# Patient Record
Sex: MALE | Race: WHITE | NOT HISPANIC OR LATINO | Employment: FULL TIME | URBAN - NONMETROPOLITAN AREA
[De-identification: names, ages, dates, MRNs, and addresses within clinical notes are randomized per-mention and may not be internally consistent; named-entity substitution may affect disease eponyms.]

---
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
## 2020-01-28 ENCOUNTER — APPOINTMENT (OUTPATIENT)
Dept: GENERAL RADIOLOGY | Facility: CLINIC | Age: 20
End: 2020-01-28
Attending: FAMILY MEDICINE
Payer: COMMERCIAL

## 2020-01-28 ENCOUNTER — APPOINTMENT (OUTPATIENT)
Dept: GENERAL RADIOLOGY | Facility: CLINIC | Age: 20
End: 2020-01-28
Attending: NURSE PRACTITIONER
Payer: COMMERCIAL

## 2020-01-28 ENCOUNTER — HOSPITAL ENCOUNTER (EMERGENCY)
Facility: CLINIC | Age: 20
Discharge: HOME | End: 2020-01-28
Attending: NURSE PRACTITIONER | Admitting: NURSE PRACTITIONER
Payer: COMMERCIAL

## 2020-01-28 VITALS
RESPIRATION RATE: 16 BRPM | TEMPERATURE: 98.1 F | SYSTOLIC BLOOD PRESSURE: 143 MMHG | DIASTOLIC BLOOD PRESSURE: 81 MMHG | OXYGEN SATURATION: 99 %

## 2020-01-28 DIAGNOSIS — S62.326A CLOSED DISPLACED FRACTURE OF SHAFT OF FIFTH METACARPAL BONE OF RIGHT HAND, INITIAL ENCOUNTER: ICD-10-CM

## 2020-01-28 PROCEDURE — 73130 X-RAY EXAM OF HAND: CPT | Mod: TC,RT

## 2020-01-28 PROCEDURE — 26600 TREAT METACARPAL FRACTURE: CPT | Mod: RT | Performed by: NURSE PRACTITIONER

## 2020-01-28 PROCEDURE — 99284 EMERGENCY DEPT VISIT MOD MDM: CPT | Mod: 25 | Performed by: NURSE PRACTITIONER

## 2020-01-28 PROCEDURE — 99282 EMERGENCY DEPT VISIT SF MDM: CPT | Mod: 25 | Performed by: NURSE PRACTITIONER

## 2020-01-28 PROCEDURE — 26600 TREAT METACARPAL FRACTURE: CPT | Mod: 54 | Performed by: NURSE PRACTITIONER

## 2020-01-28 PROCEDURE — 40000986 XR HAND PORT RT G/E 3 VW: Mod: RT

## 2020-01-28 PROCEDURE — 25000132 ZZH RX MED GY IP 250 OP 250 PS 637: Performed by: NURSE PRACTITIONER

## 2020-01-28 RX ORDER — IBUPROFEN 600 MG/1
600 TABLET, FILM COATED ORAL ONCE
Status: COMPLETED | OUTPATIENT
Start: 2020-01-28 | End: 2020-01-28

## 2020-01-28 RX ADMIN — IBUPROFEN 600 MG: 600 TABLET ORAL at 17:06

## 2020-01-28 NOTE — LETTER
January 28, 2020      To Whom It May Concern:      Miguel Sol was seen in our Emergency Department today, 01/28/20.  He cannot use his right hand until cleared by orthopedics.    Thank you      Sincerely,        CRISTOBAL Harrington CNP

## 2020-01-28 NOTE — DISCHARGE INSTRUCTIONS
I would recommend scheduled ibuprofen, 600 mg every 6 hours for pain and swelling, this can be alternated with Tylenol 650 mg every 4 hours.  I would plan on doing this for the next 2 to 3 days at least.  Ice to right hand for 20 minutes at a time every 1-2 hours for the first 48 hours, keep elevated above the heart to help with the swelling.  Follow-up with orthopedic specialist on February 5 as scheduled.
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
## 2020-02-05 ENCOUNTER — ANCILLARY PROCEDURE (OUTPATIENT)
Dept: GENERAL RADIOLOGY | Facility: CLINIC | Age: 20
End: 2020-02-05
Attending: ORTHOPAEDIC SURGERY

## 2020-02-05 ENCOUNTER — OFFICE VISIT (OUTPATIENT)
Dept: ORTHOPEDICS | Facility: CLINIC | Age: 20
End: 2020-02-05

## 2020-02-05 VITALS — WEIGHT: 168.2 LBS | RESPIRATION RATE: 18 BRPM | BODY MASS INDEX: 23.55 KG/M2 | HEIGHT: 71 IN

## 2020-02-05 DIAGNOSIS — T14.8XXA FRACTURE: ICD-10-CM

## 2020-02-05 DIAGNOSIS — S62.326A CLOSED DISPLACED FRACTURE OF SHAFT OF FIFTH METACARPAL BONE OF RIGHT HAND, INITIAL ENCOUNTER: Primary | ICD-10-CM

## 2020-02-05 PROCEDURE — 73130 X-RAY EXAM OF HAND: CPT | Mod: TC

## 2020-02-05 PROCEDURE — 99203 OFFICE O/P NEW LOW 30 MIN: CPT | Mod: 57 | Performed by: ORTHOPAEDIC SURGERY

## 2020-02-05 PROCEDURE — 26605 TREAT METACARPAL FRACTURE: CPT | Mod: RT | Performed by: ORTHOPAEDIC SURGERY

## 2020-02-05 ASSESSMENT — MIFFLIN-ST. JEOR: SCORE: 1800.08

## 2020-02-05 ASSESSMENT — PAIN SCALES - GENERAL: PAINLEVEL: NO PAIN (1)

## 2020-02-05 NOTE — LETTER
2/5/2020         RE: Miguel Sol  [address] [facility] [address]        Dear Colleague,    Thank you for referring your patient, Miguel Sol, to the [facility]. Please see a copy of my visit note below.    Assessment / Plan     ICD-10-CM    1. Closed displaced fracture of shaft of fifth metacarpal bone of right hand, initial encounter S62.326A CAST SUPPLY S ARM SPLINT ADULT     Case Request: OPEN REDUCTION INTERNAL FIXATION, FRACTURE, HAND right 5th metacarpal shaft     Case Request: OPEN REDUCTION INTERNAL FIXATION, FRACTURE, HAND right 5th metacarpal shaft     XR Hand Right G/E 3 Views        Plan: He is significant concern over any type of surgical treatment for his hand fracture.  This is because he does not wish to have metal in his body for  service.  We will try a closed reduction as well as splinting.  We will see him back in a few days for repeat x-rays.  We will possibly do surgery if the fracture re-displaces.  We discussed risk benefits alternatives and potential complications of surgical treatment of this.  We also discussed that it would to be at least 6 months before we would remove any plate or hardware if open reduction internal fixation was necessary.  Discussed the risks and benefits as well as complications due to anesthesia in addition.  All questions were answered.    Procedure.  A gentle reduction maneuver was performed he was then splinted in intrinsic plus position.  Postreduction x-rays did show significantly proved alignment of his fracture.  Follow up      HPI / History / ROS   Miguel Sol is a 19 year old male who is being seen for   Chief Complaint   Patient presents with     Consult     ED f/u rt hand injury on 1/28/20   He was working on a door several days ago at work.  He then ended up hitting his hand had immediate pain and deformity.  Did go to the emergency room where he had x-rays taken.  It did show a displaced fracture of his fifth  metacarpal.  This was reduced slightly and then splinted.  He is here for further evaluation.    Medications  No current outpatient medications on file prior to visit.  No current facility-administered medications on file prior to visit.          Allergies     Allergies   Allergen Reactions     Wool Fiber Itching and Swelling     Penicillins Rash     other family members with allergy to same        PFSH  Past Medical History:   Diagnosis Date     ADHD (attention deficit hyperactivity disorder)      Obesity (BMI 30.0-34.9) 1/28/2015     ODD (oppositional defiant disorder)      Paranoid (H)      Phobia      Unspecified chronic suppurative otitis media      Unspecified epilepsy without mention of intractable epilepsy        Past Surgical History:   Procedure Laterality Date     HC CREATE EARDRUM OPENING,GEN ANESTH  11/2001       Family History   Problem Relation Age of Onset     Alcohol/Drug Maternal Grandfather      Heart Disease Maternal Grandfather         great     Allergies Mother         pcn     Anesthesia Reaction Mother         hard to awaken     Depression Mother      Allergies Brother         zantac,pcn     Allergies Maternal Aunt         x 2 of pcn     Cancer Maternal Grandmother         great -lung     Thyroid Disease Maternal Grandmother      Genetic Disorder Other         downs?     Genetic Disorder Paternal Aunt         downs     Gastrointestinal Disease Brother      Heart Disease Paternal Grandfather      Hypertension Paternal Grandfather      Heart Disease Father      Thyroid Disease Maternal Aunt        Social History     Tobacco Use     Smoking status: Passive Smoke Exposure - Never Smoker     Smokeless tobacco: Never Used     Tobacco comment: mother smokes, uncle   Substance Use Topics     Alcohol use: Not Currently          ROS  Review of Systems   REVIEW OF SYSTEMS  General: negative for, night sweats, dizziness, fatigue  Resp: No shortness of breath and no cough  CV: negative for chest pain,  "syncope or near-syncope  GI: negative for nausea, vomiting and diarrhea  : negative for dysuria and hematuria  Musculoskeletal: as above  Neurologic: negative for syncope   Hematologic: negative for bleeding disorder    Physical / Results   Physical Exam         Physical Exam:  Vitals: Resp 18   Ht 1.803 m (5' 11\")   Wt 76.3 kg (168 lb 3.2 oz)   BMI 23.46 kg/m     BMI= Body mass index is 23.46 kg/m .  Constitutional: healthy, alert and no acute distress   Psychiatric: mentation appears normal and affect normal/bright  NEURO: no focal deficits  RESP: Normal with easy respirations and no use of accessory muscles to breathe, no audible wheezing or retractions  CV: Pulses are palpable  SKIN: Warm with no erythema  JOINT/EXTREMITIES: Evaluation of his right hand.  He has a significant prominence in the midportion of the metacarpal region.  His skin is warm.  There does appear to be difficulty flexing his fingers.  He has tenderness along the area of the fracture.  Grossly neurologically intact.  He has good capillary refill to the fingertip.  Does have palpable pulses.      IMAGING:   X-rays show displaced fifth metacarpal midshaft fracture with significant angulation.  Slight rotational deformity is noted.  Independent visualization of the images was performed.    Corey Koo MD    Again, thank you for allowing me to participate in the care of your patient.        Sincerely,        Corey Koo MD    "

## 2020-02-06 ENCOUNTER — TELEPHONE (OUTPATIENT)
Dept: ORTHOPEDICS | Facility: CLINIC | Age: 20
End: 2020-02-06

## 2020-02-06 DIAGNOSIS — S62.326A CLOSED DISPLACED FRACTURE OF SHAFT OF FIFTH METACARPAL BONE OF RIGHT HAND, INITIAL ENCOUNTER: Primary | ICD-10-CM

## 2020-02-06 NOTE — TELEPHONE ENCOUNTER
Type of surgery: ORIF hand fracture  Location of surgery: [facility]   Date of surgery: 2/10/20  Surgeon: Dr. Koo  Pre-Op Appt Date: 2/7/20  Post-Op Appt Date: 2/25/20   Packet sent out: Surgery packet was given to patient in clinic.   Pre-cert/Authorization completed: NA  Date: 2/6/2020    Keisha Navarro  Surgery Scheduler

## 2020-02-07 ENCOUNTER — OFFICE VISIT (OUTPATIENT)
Dept: FAMILY MEDICINE | Facility: OTHER | Age: 20
End: 2020-02-07

## 2020-02-07 VITALS
SYSTOLIC BLOOD PRESSURE: 118 MMHG | TEMPERATURE: 98.3 F | BODY MASS INDEX: 23.15 KG/M2 | DIASTOLIC BLOOD PRESSURE: 82 MMHG | OXYGEN SATURATION: 99 % | RESPIRATION RATE: 16 BRPM | HEART RATE: 62 BPM | WEIGHT: 166 LBS

## 2020-02-07 DIAGNOSIS — Z01.818 PREOP GENERAL PHYSICAL EXAM: Primary | ICD-10-CM

## 2020-02-07 DIAGNOSIS — S62.326D CLOSED DISPLACED FRACTURE OF SHAFT OF FIFTH METACARPAL BONE OF RIGHT HAND WITH ROUTINE HEALING, SUBSEQUENT ENCOUNTER: ICD-10-CM

## 2020-02-07 PROCEDURE — 99214 OFFICE O/P EST MOD 30 MIN: CPT | Performed by: PHYSICIAN ASSISTANT

## 2020-02-07 ASSESSMENT — PAIN SCALES - GENERAL: PAINLEVEL: NO PAIN (0)

## 2020-02-07 NOTE — PATIENT INSTRUCTIONS
Before Your Surgery      Call your surgeon if there is any change in your health. This includes signs of a cold or flu (such as a sore throat, runny nose, cough, rash or fever).    Do not smoke, drink alcohol or take over the counter medicine (unless your surgeon or primary care doctor tells you to) for the 24 hours before and after surgery.  o No alcohol 24 hours prior to procedure  o No smoking 2 weeks before or 2 weeks afters  o NSAIDs - (ibuprofen, advil, aleve, motrin, naproxen) 5-7 days prior     If you take prescribed drugs: Follow your doctor s orders about which medicines to take and which to stop until after surgery.    Eating and drinking prior to surgery: follow the instructions from your surgeon  o No eating/drinking 8-12 hours prior     Take a shower or bath the night before surgery. Use the soap your surgeon gave you to gently clean your skin. If you do not have soap from your surgeon, use your regular soap. Do not shave or scrub the surgery site.  Wear clean pajamas and have clean sheets on your bed.

## 2020-02-07 NOTE — PROGRESS NOTES
Assessment / Plan     ICD-10-CM    1. Closed displaced fracture of shaft of fifth metacarpal bone of right hand, initial encounter S62.326A CAST SUPPLY S ARM SPLINT ADULT     Case Request: OPEN REDUCTION INTERNAL FIXATION, FRACTURE, HAND right 5th metacarpal shaft     Case Request: OPEN REDUCTION INTERNAL FIXATION, FRACTURE, HAND right 5th metacarpal shaft     XR Hand Right G/E 3 Views        Plan: He is significant concern over any type of surgical treatment for his hand fracture.  This is because he does not wish to have metal in his body for  service.  We will try a closed reduction as well as splinting.  We will see him back in a few days for repeat x-rays.  We will possibly do surgery if the fracture re-displaces.  We discussed risk benefits alternatives and potential complications of surgical treatment of this.  We also discussed that it would to be at least 6 months before we would remove any plate or hardware if open reduction internal fixation was necessary.  Discussed the risks and benefits as well as complications due to anesthesia in addition.  All questions were answered.    Procedure.  A gentle reduction maneuver was performed he was then splinted in intrinsic plus position.  Postreduction x-rays did show significantly proved alignment of his fracture.  Follow up      HPI / History / ILIANA   Miguel Sol is a 19 year old male who is being seen for   Chief Complaint   Patient presents with     Consult     ED f/u rt hand injury on 1/28/20   He was working on a door several days ago at work.  He then ended up hitting his hand had immediate pain and deformity.  Did go to the emergency room where he had x-rays taken.  It did show a displaced fracture of his fifth metacarpal.  This was reduced slightly and then splinted.  He is here for further evaluation.    Medications  No current outpatient medications on file prior to visit.  No current facility-administered medications on file prior to visit.           Allergies     Allergies   Allergen Reactions     Wool Fiber Itching and Swelling     Penicillins Rash     other family members with allergy to same        PFSH  Past Medical History:   Diagnosis Date     ADHD (attention deficit hyperactivity disorder)      Obesity (BMI 30.0-34.9) 1/28/2015     ODD (oppositional defiant disorder)      Paranoid (H)      Phobia      Unspecified chronic suppurative otitis media      Unspecified epilepsy without mention of intractable epilepsy        Past Surgical History:   Procedure Laterality Date     HC CREATE EARDRUM OPENING,GEN ANESTH  11/2001       Family History   Problem Relation Age of Onset     Alcohol/Drug Maternal Grandfather      Heart Disease Maternal Grandfather         great     Allergies Mother         pcn     Anesthesia Reaction Mother         hard to awaken     Depression Mother      Allergies Brother         zantac,pcn     Allergies Maternal Aunt         x 2 of pcn     Cancer Maternal Grandmother         great -lung     Thyroid Disease Maternal Grandmother      Genetic Disorder Other         downs?     Genetic Disorder Paternal Aunt         downs     Gastrointestinal Disease Brother      Heart Disease Paternal Grandfather      Hypertension Paternal Grandfather      Heart Disease Father      Thyroid Disease Maternal Aunt        Social History     Tobacco Use     Smoking status: Passive Smoke Exposure - Never Smoker     Smokeless tobacco: Never Used     Tobacco comment: mother smokes, uncle   Substance Use Topics     Alcohol use: Not Currently          ROS  Review of Systems   REVIEW OF SYSTEMS  General: negative for, night sweats, dizziness, fatigue  Resp: No shortness of breath and no cough  CV: negative for chest pain, syncope or near-syncope  GI: negative for nausea, vomiting and diarrhea  : negative for dysuria and hematuria  Musculoskeletal: as above  Neurologic: negative for syncope   Hematologic: negative for bleeding disorder    Physical / Results  "  Physical Exam         Physical Exam:  Vitals: Resp 18   Ht 1.803 m (5' 11\")   Wt 76.3 kg (168 lb 3.2 oz)   BMI 23.46 kg/m    BMI= Body mass index is 23.46 kg/m .  Constitutional: healthy, alert and no acute distress   Psychiatric: mentation appears normal and affect normal/bright  NEURO: no focal deficits  RESP: Normal with easy respirations and no use of accessory muscles to breathe, no audible wheezing or retractions  CV: Pulses are palpable  SKIN: Warm with no erythema  JOINT/EXTREMITIES: Evaluation of his right hand.  He has a significant prominence in the midportion of the metacarpal region.  His skin is warm.  There does appear to be difficulty flexing his fingers.  He has tenderness along the area of the fracture.  Grossly neurologically intact.  He has good capillary refill to the fingertip.  Does have palpable pulses.      IMAGING:   X-rays show displaced fifth metacarpal midshaft fracture with significant angulation.  Slight rotational deformity is noted.  Independent visualization of the images was performed.    Corey Koo MD  "

## 2020-02-07 NOTE — NURSING NOTE
Health Maintenance Due   Topic Date Due     PREVENTIVE CARE VISIT  08/04/2007     HPV IMMUNIZATION (1 - Male 2-dose series) 07/26/2011     HIV SCREENING  07/26/2015     INFLUENZA VACCINE (1) 09/01/2019       Health Maintenance reviewed at today's visit patient asked to schedule/complete:   Routine Health Visit:  Patient declined    Anali Lee MA 2/7/2020  2:55 PM
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
## 2020-02-09 ENCOUNTER — APPOINTMENT (OUTPATIENT)
Dept: GENERAL RADIOLOGY | Facility: CLINIC | Age: 20
End: 2020-02-09
Attending: FAMILY MEDICINE
Payer: COMMERCIAL

## 2020-02-09 ENCOUNTER — HOSPITAL ENCOUNTER (EMERGENCY)
Facility: CLINIC | Age: 20
Discharge: HOME | End: 2020-02-09
Attending: FAMILY MEDICINE | Admitting: FAMILY MEDICINE
Payer: COMMERCIAL

## 2020-02-09 VITALS
TEMPERATURE: 97.5 F | WEIGHT: 165 LBS | OXYGEN SATURATION: 100 % | DIASTOLIC BLOOD PRESSURE: 73 MMHG | HEART RATE: 92 BPM | RESPIRATION RATE: 16 BRPM | BODY MASS INDEX: 23.01 KG/M2 | SYSTOLIC BLOOD PRESSURE: 127 MMHG

## 2020-02-09 DIAGNOSIS — M79.641 PAIN OF RIGHT HAND: ICD-10-CM

## 2020-02-09 PROCEDURE — 99283 EMERGENCY DEPT VISIT LOW MDM: CPT | Mod: Z6 | Performed by: FAMILY MEDICINE

## 2020-02-09 PROCEDURE — 99283 EMERGENCY DEPT VISIT LOW MDM: CPT | Performed by: FAMILY MEDICINE

## 2020-02-09 PROCEDURE — 73130 X-RAY EXAM OF HAND: CPT | Mod: TC,RT

## 2020-02-09 ASSESSMENT — ENCOUNTER SYMPTOMS: NUMBNESS: 1
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
## 2020-02-09 NOTE — ED AVS SNAPSHOT
[facility] Emergency Department  [address]   [place] [address]  Phone:  [phone]  Fax:  [phone]                                    Miguel Sol   MRN: [id number]    Department:  [facility] Emergency Department   Date of Visit:  2/9/2020           After Visit Summary Signature Page    I have received my discharge instructions, and my questions have been answered. I have discussed any challenges I see with this plan with the nurse or doctor.    ..........................................................................................................................................  Patient/Patient Representative Signature      ..........................................................................................................................................  Patient Representative Print Name and Relationship to Patient    ..................................................               ................................................  Date                                   Time    ..........................................................................................................................................  Reviewed by Signature/Title    ...................................................              ..............................................  Date                                               Time          22EPIC Rev 08/18

## 2020-02-09 NOTE — DISCHARGE INSTRUCTIONS
Keep the hand elevated above the level of your heart - use the sling to prevent swelling, pain and numbness.

## 2020-02-09 NOTE — ED TRIAGE NOTES
Patient with numbness to R hand, had a cast placed on Tuesday of this week. States hand has felt numb since yesterday.

## 2020-02-10 ENCOUNTER — HOSPITAL ENCOUNTER (OUTPATIENT)
Facility: CLINIC | Age: 20
Discharge: HOME | End: 2020-02-10
Attending: ORTHOPAEDIC SURGERY | Admitting: ORTHOPAEDIC SURGERY
Payer: COMMERCIAL

## 2020-02-10 ENCOUNTER — DOCUMENTATION ONLY (OUTPATIENT)
Dept: OTHER | Facility: CLINIC | Age: 20
End: 2020-02-10

## 2020-02-10 ENCOUNTER — HOSPITAL ENCOUNTER (OUTPATIENT)
Dept: GENERAL RADIOLOGY | Facility: CLINIC | Age: 20
End: 2020-02-10
Attending: PHYSICIAN ASSISTANT
Payer: COMMERCIAL

## 2020-02-10 ENCOUNTER — ANESTHESIA (OUTPATIENT)
Dept: SURGERY | Facility: CLINIC | Age: 20
End: 2020-02-10

## 2020-02-10 ENCOUNTER — ANESTHESIA EVENT (OUTPATIENT)
Dept: SURGERY | Facility: CLINIC | Age: 20
End: 2020-02-10

## 2020-02-10 DIAGNOSIS — S62.326A CLOSED DISPLACED FRACTURE OF SHAFT OF FIFTH METACARPAL BONE OF RIGHT HAND, INITIAL ENCOUNTER: ICD-10-CM

## 2020-02-10 PROCEDURE — 73130 X-RAY EXAM OF HAND: CPT | Mod: TC

## 2020-02-10 NOTE — ANESTHESIA PREPROCEDURE EVALUATION
Anesthesia Pre-Procedure Evaluation    Patient: Miguel Sol   MRN: [id number] : 2000          Preoperative Diagnosis: Closed displaced fracture of shaft of fifth metacarpal bone of right hand, initial encounter [S62.326A]    Procedure(s):  OPEN REDUCTION INTERNAL FIXATION, FRACTURE, HAND right 5th metacarpal shaft    Past Medical History:   Diagnosis Date     ADHD (attention deficit hyperactivity disorder)      Obesity (BMI 30.0-34.9) 2015     ODD (oppositional defiant disorder)      Paranoid (H)      Phobia      Unspecified chronic suppurative otitis media      Unspecified epilepsy without mention of intractable epilepsy      Past Surgical History:   Procedure Laterality Date     HC CREATE EARDRUM OPENING,GEN ANESTH  2001       Anesthesia Evaluation     . Pt has had prior anesthetic. Type: General and MAC    No history of anesthetic complications          ROS/MED HX    ENT/Pulmonary:     (+)other ENT- perforated eardrum, chronic otitis media, , . .    Neurologic:  - neg neurologic ROS     Cardiovascular:  - neg cardiovascular ROS   (+) ----. : . . . :. . Previous cardiac testing date:results:date: results:ECG reviewed date:19 results:Sinus Rhythm   WITHIN NORMAL LIMITS   date: results:          METS/Exercise Tolerance:  >4 METS   Hematologic:  - neg hematologic  ROS       Musculoskeletal:  - neg musculoskeletal ROS       GI/Hepatic:  - neg GI/hepatic ROS       Renal/Genitourinary:  - ROS Renal section negative       Endo:  - neg endo ROS       Psychiatric: Comment: ADHD, phobia, paranoid, oppositional defiant disorder    (+) psychiatric history other (comment)      Infectious Disease:  - neg infectious disease ROS       Malignancy:      - no malignancy   Other:    - neg other ROS                      Physical Exam  Normal systems: cardiovascular, pulmonary and dental    Airway   Mallampati: II  TM distance: >3 FB  Neck ROM: full    Dental     Cardiovascular   Rhythm and rate: regular and  "normal      Pulmonary    breath sounds clear to auscultation            Lab Results   Component Value Date    WBC 8.9 07/20/2019    HGB 14.5 07/20/2019    HCT 42.4 07/20/2019     07/20/2019    SED 10 06/10/2004     07/20/2019    POTASSIUM 3.8 07/20/2019    CHLORIDE 106 07/20/2019    CO2 27 07/20/2019    BUN 14 07/20/2019    CR 0.78 07/20/2019    GLC 90 07/20/2019    KUNAL 9.1 07/20/2019       Preop Vitals  BP Readings from Last 3 Encounters:   02/09/20 127/73   02/07/20 118/82   01/28/20 (!) 143/81    Pulse Readings from Last 3 Encounters:   02/09/20 92   02/07/20 62   11/07/19 74      Resp Readings from Last 3 Encounters:   02/09/20 16   02/07/20 16   02/05/20 18    SpO2 Readings from Last 3 Encounters:   02/09/20 100%   02/07/20 99%   01/28/20 99%      Temp Readings from Last 1 Encounters:   02/09/20 97.5  F (36.4  C) (Oral)    Ht Readings from Last 1 Encounters:   02/05/20 1.803 m (5' 11\") (69 %)*     * Growth percentiles are based on CDC (Boys, 2-20 Years) data.      Wt Readings from Last 1 Encounters:   02/09/20 74.8 kg (165 lb) (66 %)*     * Growth percentiles are based on CDC (Boys, 2-20 Years) data.    Estimated body mass index is 23.01 kg/m  as calculated from the following:    Height as of 2/5/20: 1.803 m (5' 11\").    Weight as of 2/9/20: 74.8 kg (165 lb).       Anesthesia Plan      History & Physical Review  History and physical reviewed and following examination; no interval change.    ASA Status:  1 .    NPO Status:  > 8 hours    Plan for ETT and General with Propofol and Intravenous induction. Maintenance will be Inhalation and Balanced.    PONV prophylaxis:  Ondansetron (or other 5HT-3) and Dexamethasone or Solumedrol       Postoperative Care  Postoperative pain management:  IV analgesics, Multi-modal analgesia and Oral pain medications.      Consents  Anesthetic plan, risks, benefits and alternatives discussed with:  Patient.  Use of blood products discussed: No .   .           "       Carmen Delgado, APRN CRNA

## 2020-02-12 NOTE — PROGRESS NOTES
"Subjective     Miguel Sol is a 19 year old male who presents to clinic today for the following health issues:    Patient is here to get tested for STD.     Just found out that his last girlfriend had an STD, but she wouldn't tell him what one she had.      Denies pain or discharge, other c/o.             Review of Systems   ROS COMP: Constitutional, HEENT, cardiovascular, pulmonary, gi and gu systems are negative, except as otherwise noted.       Objective    /70   Pulse 89   Temp 97  F (36.1  C) (Temporal)   Resp 18   Ht 1.803 m (5' 11\")   Wt 74.5 kg (164 lb 3.2 oz)   SpO2 100%   BMI 22.90 kg/m    Body mass index is 22.9 kg/m .  Physical Exam   GENERAL: healthy, alert and no distress  NECK: no adenopathy, no asymmetry, masses, or scars and thyroid normal to palpation  RESP: lungs clear to auscultation - no rales, rhonchi or wheezes  CV: regular rate and rhythm, normal S1 S2, no S3 or S4, no murmur, click or rub, no peripheral edema and peripheral pulses strong  ABDOMEN: soft, nontender, no hepatosplenomegaly, no masses and bowel sounds normal  MS: no gross musculoskeletal defects noted, no edema.  Right hand/wrist in a cast.      Diagnostic Test Results:  Labs reviewed in Epic  Results for orders placed or performed in visit on 02/13/20   Neisseria gonorrhoeae PCR     Status: None   Result Value Ref Range    Specimen Descrip Urine     N Gonorrhea PCR Negative NEG^Negative   Chlamydia trachomatis PCR     Status: None   Result Value Ref Range    Specimen Description Urine     Chlamydia Trachomatis PCR Negative NEG^Negative   Results for orders placed or performed in visit on 02/13/20   HIV Screening     Status: None   Result Value Ref Range    HIV Antigen Antibody Combo Nonreactive NR^Nonreactive       Treponema Abs w Reflex to RPR and Titer     Status: None   Result Value Ref Range    Treponema Antibodies Nonreactive NR^Nonreactive   Hepatitis B surface antigen     Status: None   Result Value Ref " "Range    Hep B Surface Agn Nonreactive NR^Nonreactive   Hepatitis C antibody     Status: None   Result Value Ref Range    Hepatitis C Antibody Nonreactive NR^Nonreactive           Assessment & Plan       ICD-10-CM    1. Screen for STD (sexually transmitted disease) Z11.3 HIV Screening     Treponema Abs w Reflex to RPR and Titer     Hepatitis B surface antigen     Hepatitis C antibody     Chlamydia trachomatis PCR     Neisseria gonorrhoeae PCR     CANCELED: NEISSERIA GONORRHOEA PCR     CANCELED: CHLAMYDIA TRACHOMATIS PCR   2. Influenza vaccine refused Z28.21      1.  Discussed briefly STDs that we can screen for and which ones he wished to pursue testing for.  At this time, he does not have any symptoms characteristic of any particular STD.  Appropriate testing was ordered.  2.  Patient declined immunization.  Discussed risks of not immunizing.    Portions of this note were completed using Dragon dictation software.  Although reviewed, there may be typographical and other inadvertent errors that remain.                Patient Instructions   Thank you for visiting [facility]    We'll let you know your lab results as soon as we can.     I would recommend you consider getting vaccinated.    Contact us or return if questions or concerns.     Have a nice day!    Dr. Gray     Return in about 1 year (around 2/13/2021) for Physical Exam.      If you had imaging scheduled please refer to your radiology prep sheet.      If you need medication refills, please contact your pharmacy 3 days before your prescriptions runs out or download the [facility] mirna for your smart phone.   If you are out of refills, your pharmacy will contact contact the clinic.    Contact us or return if questions or concerns.     -Your [facility] Care Team:    MD Caroline Webster PA-C Joel De Haan, PA-C Anna Niesen, PA-C Elizabeth \"\" CRISTOBAL Tyler CNP, APRN, NIC Valdes" CRISTOBAL Su, CNP  Almas Taylor, RN, BSN       General information about your   [facility]      Clinic hours:     Lab hours:  Phone [phone]  Monday 7:30 am-7 pm    Monday 8:30 am-6:30 pm  Tuesday-Friday 7:30 am-5 pm   Tuesday-Friday 8:30 am-4:30 pm    Pharmacy hours:  Phone [phone]  Monday 8:30 am-7pm  Tuesday-Friday 8:30am-6 pm                                     Mychart assistance [phone]    We would like to hear from you, how was your visit today?    Meseret France  Patient Information Supervisor   Patient Care Supervisor  [facility], and \[facility] [place] [facility]\"", and [facility]                Return in about 1 year (around 2/13/2021) for Physical Exam.    Pastor Gray MD, MD  [facility]

## 2020-02-13 ENCOUNTER — OFFICE VISIT (OUTPATIENT)
Dept: FAMILY MEDICINE | Facility: OTHER | Age: 20
End: 2020-02-13
Payer: COMMERCIAL

## 2020-02-13 VITALS
BODY MASS INDEX: 22.99 KG/M2 | TEMPERATURE: 97 F | DIASTOLIC BLOOD PRESSURE: 70 MMHG | RESPIRATION RATE: 18 BRPM | HEART RATE: 89 BPM | SYSTOLIC BLOOD PRESSURE: 112 MMHG | WEIGHT: 164.2 LBS | HEIGHT: 71 IN | OXYGEN SATURATION: 100 %

## 2020-02-13 DIAGNOSIS — Z28.21 INFLUENZA VACCINE REFUSED: ICD-10-CM

## 2020-02-13 DIAGNOSIS — Z11.3 SCREEN FOR STD (SEXUALLY TRANSMITTED DISEASE): Primary | ICD-10-CM

## 2020-02-13 DIAGNOSIS — Z11.3 SCREEN FOR STD (SEXUALLY TRANSMITTED DISEASE): ICD-10-CM

## 2020-02-13 PROCEDURE — 86803 HEPATITIS C AB TEST: CPT | Performed by: FAMILY MEDICINE

## 2020-02-13 PROCEDURE — G0499 HEPB SCREEN HIGH RISK INDIV: HCPCS | Performed by: FAMILY MEDICINE

## 2020-02-13 PROCEDURE — 86780 TREPONEMA PALLIDUM: CPT | Performed by: FAMILY MEDICINE

## 2020-02-13 PROCEDURE — 99213 OFFICE O/P EST LOW 20 MIN: CPT | Performed by: FAMILY MEDICINE

## 2020-02-13 PROCEDURE — 87491 CHLMYD TRACH DNA AMP PROBE: CPT | Performed by: FAMILY MEDICINE

## 2020-02-13 PROCEDURE — 36415 COLL VENOUS BLD VENIPUNCTURE: CPT | Performed by: FAMILY MEDICINE

## 2020-02-13 PROCEDURE — 87591 N.GONORRHOEAE DNA AMP PROB: CPT | Performed by: FAMILY MEDICINE

## 2020-02-13 PROCEDURE — 87389 HIV-1 AG W/HIV-1&-2 AB AG IA: CPT | Performed by: FAMILY MEDICINE

## 2020-02-13 ASSESSMENT — PAIN SCALES - GENERAL: PAINLEVEL: NO PAIN (0)

## 2020-02-13 ASSESSMENT — MIFFLIN-ST. JEOR: SCORE: 1781.94

## 2020-02-13 NOTE — PATIENT INSTRUCTIONS
"Thank you for visiting [facility]    We'll let you know your lab results as soon as we can.     I would recommend you consider getting vaccinated.    Contact us or return if questions or concerns.     Have a nice day!    Dr. Gray     Return in about 1 year (around 2/13/2021) for Physical Exam.      If you had imaging scheduled please refer to your radiology prep sheet.      If you need medication refills, please contact your pharmacy 3 days before your prescriptions runs out or download the [facility] mirna for your smart phone.   If you are out of refills, your pharmacy will contact contact the clinic.    Contact us or return if questions or concerns.     -Your [facility] Care Team:    MD Caroline Webster, ZENAIDA De Los Santos PA-C Elizabeth \"\" CRISTOBAL Tyler CNP, APRN, NIC Su APRN, CNP  Almas Taylor, RN, BSN       General information about your   [facility]      Clinic hours:     Lab hours:  Phone [phone]  Monday 7:30 am-7 pm    Monday 8:30 am-6:30 pm  Tuesday-Friday 7:30 am-5 pm   Tuesday-Friday 8:30 am-4:30 pm    Pharmacy hours:  Phone [phone]  Monday 8:30 am-7pm  Tuesday-Friday 8:30am-6 pm                                     Mychart assistance [phone]    We would like to hear from you, how was your visit today?    Meseret France  Patient Information Supervisor   Patient Care Supervisor  [facility], and [facility] [place], and [facility]            "

## 2020-02-14 LAB
C TRACH DNA SPEC QL NAA+PROBE: NEGATIVE
HBV SURFACE AG SERPL QL IA: NONREACTIVE
HCV AB SERPL QL IA: NONREACTIVE
HIV 1+2 AB+HIV1 P24 AG SERPL QL IA: NONREACTIVE
N GONORRHOEA DNA SPEC QL NAA+PROBE: NEGATIVE
SPECIMEN SOURCE: NORMAL
SPECIMEN SOURCE: NORMAL
T PALLIDUM AB SER QL: NONREACTIVE

## 2020-02-25 ENCOUNTER — OFFICE VISIT (OUTPATIENT)
Dept: ORTHOPEDICS | Facility: CLINIC | Age: 20
End: 2020-02-25
Payer: COMMERCIAL

## 2020-02-25 ENCOUNTER — HOSPITAL ENCOUNTER (EMERGENCY)
Facility: CLINIC | Age: 20
Discharge: HOME | End: 2020-02-25
Attending: FAMILY MEDICINE | Admitting: FAMILY MEDICINE
Payer: COMMERCIAL

## 2020-02-25 ENCOUNTER — ANCILLARY PROCEDURE (OUTPATIENT)
Dept: GENERAL RADIOLOGY | Facility: CLINIC | Age: 20
End: 2020-02-25
Attending: ORTHOPAEDIC SURGERY
Payer: COMMERCIAL

## 2020-02-25 VITALS
DIASTOLIC BLOOD PRESSURE: 63 MMHG | RESPIRATION RATE: 16 BRPM | OXYGEN SATURATION: 97 % | BODY MASS INDEX: 22.59 KG/M2 | SYSTOLIC BLOOD PRESSURE: 105 MMHG | HEART RATE: 107 BPM | TEMPERATURE: 98.3 F | WEIGHT: 162 LBS

## 2020-02-25 VITALS — BODY MASS INDEX: 22.96 KG/M2 | WEIGHT: 164 LBS | HEIGHT: 71 IN

## 2020-02-25 DIAGNOSIS — R11.2 NON-INTRACTABLE VOMITING WITH NAUSEA, UNSPECIFIED VOMITING TYPE: ICD-10-CM

## 2020-02-25 DIAGNOSIS — S62.326A CLOSED DISPLACED FRACTURE OF SHAFT OF FIFTH METACARPAL BONE OF RIGHT HAND, INITIAL ENCOUNTER: ICD-10-CM

## 2020-02-25 DIAGNOSIS — S62.326A CLOSED DISPLACED FRACTURE OF SHAFT OF FIFTH METACARPAL BONE OF RIGHT HAND, INITIAL ENCOUNTER: Primary | ICD-10-CM

## 2020-02-25 DIAGNOSIS — K92.0 HEMATEMESIS, PRESENCE OF NAUSEA NOT SPECIFIED: ICD-10-CM

## 2020-02-25 LAB
ALBUMIN SERPL-MCNC: 4.5 G/DL (ref 3.4–5)
ALP SERPL-CCNC: 80 U/L (ref 65–260)
ALT SERPL W P-5'-P-CCNC: 16 U/L (ref 0–50)
ANION GAP SERPL CALCULATED.3IONS-SCNC: 7 MMOL/L (ref 3–14)
APTT PPP: 30 SEC (ref 22–37)
AST SERPL W P-5'-P-CCNC: 8 U/L (ref 0–35)
BASOPHILS # BLD AUTO: 0.1 10E9/L (ref 0–0.2)
BASOPHILS NFR BLD AUTO: 0.7 %
BILIRUB SERPL-MCNC: 0.7 MG/DL (ref 0.2–1.3)
BUN SERPL-MCNC: 10 MG/DL (ref 7–30)
CALCIUM SERPL-MCNC: 9.4 MG/DL (ref 8.5–10.1)
CHLORIDE SERPL-SCNC: 107 MMOL/L (ref 98–110)
CO2 SERPL-SCNC: 27 MMOL/L (ref 20–32)
CREAT SERPL-MCNC: 0.8 MG/DL (ref 0.5–1)
DIFFERENTIAL METHOD BLD: NORMAL
EOSINOPHIL NFR BLD AUTO: 1.1 %
ERYTHROCYTE [DISTWIDTH] IN BLOOD BY AUTOMATED COUNT: 12.7 % (ref 10–15)
GFR SERPL CREATININE-BSD FRML MDRD: >90 ML/MIN/{1.73_M2}
GLUCOSE SERPL-MCNC: 77 MG/DL (ref 70–99)
HCT VFR BLD AUTO: 48.3 % (ref 40–53)
HGB BLD-MCNC: 16.4 G/DL (ref 13.3–17.7)
IMM GRANULOCYTES # BLD: 0 10E9/L (ref 0–0.4)
IMM GRANULOCYTES NFR BLD: 0.4 %
INR PPP: 1.13 (ref 0.86–1.14)
LIPASE SERPL-CCNC: 55 U/L (ref 73–393)
LYMPHOCYTES # BLD AUTO: 1.5 10E9/L (ref 0.8–5.3)
LYMPHOCYTES NFR BLD AUTO: 17.5 %
MCH RBC QN AUTO: 29.3 PG (ref 26.5–33)
MCHC RBC AUTO-ENTMCNC: 34 G/DL (ref 31.5–36.5)
MCV RBC AUTO: 86 FL (ref 78–100)
MONOCYTES # BLD AUTO: 0.7 10E9/L (ref 0–1.3)
MONOCYTES NFR BLD AUTO: 8.9 %
NEUTROPHILS # BLD AUTO: 5.9 10E9/L (ref 1.6–8.3)
NEUTROPHILS NFR BLD AUTO: 71.4 %
NRBC # BLD AUTO: 0 10*3/UL
NRBC BLD AUTO-RTO: 0 /100
PLATELET # BLD AUTO: 261 10E9/L (ref 150–450)
POTASSIUM SERPL-SCNC: 4.2 MMOL/L (ref 3.4–5.3)
PROT SERPL-MCNC: 8.5 G/DL (ref 6.8–8.8)
RBC # BLD AUTO: 5.6 10E12/L (ref 4.4–5.9)
SODIUM SERPL-SCNC: 141 MMOL/L (ref 133–144)
WBC # BLD AUTO: 8.3 10E9/L (ref 4–11)

## 2020-02-25 PROCEDURE — 80053 COMPREHEN METABOLIC PANEL: CPT | Performed by: FAMILY MEDICINE

## 2020-02-25 PROCEDURE — 99207 ZZC FRACTURE CARE IN GLOBAL PERIOD: CPT | Performed by: ORTHOPAEDIC SURGERY

## 2020-02-25 PROCEDURE — 85730 THROMBOPLASTIN TIME PARTIAL: CPT | Performed by: FAMILY MEDICINE

## 2020-02-25 PROCEDURE — 99284 EMERGENCY DEPT VISIT MOD MDM: CPT | Mod: 25 | Performed by: FAMILY MEDICINE

## 2020-02-25 PROCEDURE — 73130 X-RAY EXAM OF HAND: CPT | Mod: TC

## 2020-02-25 PROCEDURE — 96361 HYDRATE IV INFUSION ADD-ON: CPT | Performed by: FAMILY MEDICINE

## 2020-02-25 PROCEDURE — 85610 PROTHROMBIN TIME: CPT | Performed by: FAMILY MEDICINE

## 2020-02-25 PROCEDURE — 83690 ASSAY OF LIPASE: CPT | Performed by: FAMILY MEDICINE

## 2020-02-25 PROCEDURE — 85025 COMPLETE CBC W/AUTO DIFF WBC: CPT | Performed by: FAMILY MEDICINE

## 2020-02-25 PROCEDURE — 25000128 H RX IP 250 OP 636: Performed by: FAMILY MEDICINE

## 2020-02-25 PROCEDURE — 96374 THER/PROPH/DIAG INJ IV PUSH: CPT | Performed by: FAMILY MEDICINE

## 2020-02-25 PROCEDURE — C9113 INJ PANTOPRAZOLE SODIUM, VIA: HCPCS | Performed by: FAMILY MEDICINE

## 2020-02-25 PROCEDURE — 99284 EMERGENCY DEPT VISIT MOD MDM: CPT | Mod: Z6 | Performed by: FAMILY MEDICINE

## 2020-02-25 PROCEDURE — 25800030 ZZH RX IP 258 OP 636: Performed by: FAMILY MEDICINE

## 2020-02-25 RX ORDER — OMEPRAZOLE 40 MG/1
40 CAPSULE, DELAYED RELEASE ORAL DAILY
Qty: 15 CAPSULE | Refills: 0 | Status: SHIPPED | OUTPATIENT
Start: 2020-02-25 | End: 2020-03-11

## 2020-02-25 RX ORDER — ONDANSETRON HYDROCHLORIDE 4 MG/5ML
0.1 SOLUTION ORAL ONCE
Status: COMPLETED | OUTPATIENT
Start: 2020-02-25 | End: 2020-02-25

## 2020-02-25 RX ORDER — ONDANSETRON 4 MG/1
4 TABLET, ORALLY DISINTEGRATING ORAL EVERY 6 HOURS PRN
Qty: 10 TABLET | Refills: 0 | Status: SHIPPED | OUTPATIENT
Start: 2020-02-25 | End: 2021-06-30

## 2020-02-25 RX ADMIN — SODIUM CHLORIDE 80 MG: 9 INJECTION, SOLUTION INTRAVENOUS at 20:40

## 2020-02-25 RX ADMIN — SODIUM CHLORIDE 1000 ML: 9 INJECTION, SOLUTION INTRAVENOUS at 20:40

## 2020-02-25 RX ADMIN — ONDANSETRON HYDROCHLORIDE 8 MG: 4 SOLUTION ORAL at 21:04

## 2020-02-25 ASSESSMENT — MIFFLIN-ST. JEOR: SCORE: 1781.03

## 2020-02-25 NOTE — LETTER
2/25/2020         RE: Miguel Sol  [address] [facility] [address]        Dear Colleague,    Thank you for referring your patient, Miguel Sol, to the [facility]. Please see a copy of my visit note below.    Assessment / Plan     ICD-10-CM    1. Closed displaced fracture of shaft of fifth metacarpal bone of right hand, initial encounter S62.326A XR Hand Right G/E 3 Views        Plan: We are going to keep in the cast for 1 more week.  At that time we will take him out and put him into a brace for protection if it is not completely healed.  We will see him back in 1 week.  Follow up      HPI / History / ROS   Miguel Sol is a 19 year old male who is being seen for   Chief Complaint   Patient presents with     Fracture Followup     rt hand doi 1/28/20    East 2 and half weeks out from his fifth metacarpal shaft fracture.  Overall he is been reduced and placed into a cast which has held his position quite well.  He is not having any significant problems except the cast is somewhat worn.  Pain is been well controlled.    Medications  No current outpatient medications on file prior to visit.  No current facility-administered medications on file prior to visit.          Allergies     Allergies   Allergen Reactions     Wool Fiber Itching and Swelling     Penicillins Rash     other family members with allergy to same        PFSH  Past Medical History:   Diagnosis Date     ADHD (attention deficit hyperactivity disorder)      Obesity (BMI 30.0-34.9) 1/28/2015     ODD (oppositional defiant disorder)      Paranoid (H)      Phobia      Unspecified chronic suppurative otitis media      Unspecified epilepsy without mention of intractable epilepsy        Past Surgical History:   Procedure Laterality Date     HC CREATE EARDRUM OPENING,GEN ANESTH  11/2001       Family History   Problem Relation Age of Onset     Alcohol/Drug Maternal Grandfather      Heart Disease Maternal Grandfather         great      "Allergies Mother         pcn     Anesthesia Reaction Mother         hard to awaken     Depression Mother      Allergies Brother         zantac,pcn     Allergies Maternal Aunt         x 2 of pcn     Cancer Maternal Grandmother         great -lung     Thyroid Disease Maternal Grandmother      Genetic Disorder Other         downs?     Genetic Disorder Paternal Aunt         downs     Gastrointestinal Disease Brother      Heart Disease Paternal Grandfather      Hypertension Paternal Grandfather      Heart Disease Father      Thyroid Disease Maternal Aunt        Social History     Tobacco Use     Smoking status: Passive Smoke Exposure - Never Smoker     Smokeless tobacco: Never Used     Tobacco comment: mother smokes, uncle   Substance Use Topics     Alcohol use: Not Currently          ROS  Review of Systems   REVIEW OF SYSTEMS  General: negative for, night sweats, dizziness, fatigue  Resp: No shortness of breath and no cough  CV: negative for chest pain, syncope or near-syncope  GI: negative for nausea, vomiting and diarrhea  : negative for dysuria and hematuria  Musculoskeletal: as above  Neurologic: negative for syncope   Hematologic: negative for bleeding disorder    Physical / Results   Physical Exam         Physical Exam:  Vitals: Ht 1.803 m (5' 11\")   Wt 74.4 kg (164 lb)   BMI 22.87 kg/m     BMI= Body mass index is 22.87 kg/m .  Constitutional: healthy, alert and no acute distress   Psychiatric: mentation appears normal and affect normal/bright  NEURO: no focal deficits  RESP: Normal with easy respirations and no use of accessory muscles to breathe, no audible wheezing or retractions  CV: Pulses are palpable  SKIN: Warm with no erythema  JOINT/EXTREMITIES: He is neurovascular intact.  There is no changes to his fingertips.  His brisk capillary refill.      IMAGING:   Evaluation of his right hand x-rays which show fifth metacarpal fracture in good position.  Has had very little change since the last x-rays were " taken.  There is callus formation.  Independent visualization of the images was performed.    Corey Koo MD    Again, thank you for allowing me to participate in the care of your patient.        Sincerely,        Corey Koo MD

## 2020-02-25 NOTE — ED AVS SNAPSHOT
[facility] Emergency Department  [address]   [place] [address]  Phone:  [phone]  Fax:  [phone]                                    Miguel Sol   MRN: [id number]    Department:  [facility] Emergency Department   Date of Visit:  2/25/2020           After Visit Summary Signature Page    I have received my discharge instructions, and my questions have been answered. I have discussed any challenges I see with this plan with the nurse or doctor.    ..........................................................................................................................................  Patient/Patient Representative Signature      ..........................................................................................................................................  Patient Representative Print Name and Relationship to Patient    ..................................................               ................................................  Date                                   Time    ..........................................................................................................................................  Reviewed by Signature/Title    ...................................................              ..............................................  Date                                               Time          22EPIC Rev 08/18
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
## 2020-02-26 NOTE — ED NOTES
Pt kept asking about IV removal As I was going over discontinue info. Pt was uninterested as I explained things to him. Texting and talking on the phone.

## 2020-02-26 NOTE — DISCHARGE INSTRUCTIONS
At work were reassuring.  There is no signs of coagulopathy or bleeding problems.  You received Protonix in the emergency department.  Continue Zofran 6 hours as needed for nausea next 2-3 days and omeprazole 40 mg daily 2 weeks.   All up with your primary care provider in 2-3 days if you are still vomiting blood.  You may need an upper endoscopy.

## 2020-02-26 NOTE — ED TRIAGE NOTES
Patient states he's been vomiting blood with any intake. C/o mid epigastric pain and mid chest pain. Similar symptoms when under stress.

## 2020-02-27 NOTE — PROGRESS NOTES
Assessment / Plan     ICD-10-CM    1. Closed displaced fracture of shaft of fifth metacarpal bone of right hand, initial encounter S62.326A XR Hand Right G/E 3 Views        Plan: We are going to keep in the cast for 1 more week.  At that time we will take him out and put him into a brace for protection if it is not completely healed.  We will see him back in 1 week.  Follow up      HPI / History / ROS   Miguel Sol is a 19 year old male who is being seen for   Chief Complaint   Patient presents with     Fracture Followup     rt hand doi 1/28/20    East 2 and half weeks out from his fifth metacarpal shaft fracture.  Overall he is been reduced and placed into a cast which has held his position quite well.  He is not having any significant problems except the cast is somewhat worn.  Pain is been well controlled.    Medications  No current outpatient medications on file prior to visit.  No current facility-administered medications on file prior to visit.          Allergies     Allergies   Allergen Reactions     Wool Fiber Itching and Swelling     Penicillins Rash     other family members with allergy to same        PFSH  Past Medical History:   Diagnosis Date     ADHD (attention deficit hyperactivity disorder)      Obesity (BMI 30.0-34.9) 1/28/2015     ODD (oppositional defiant disorder)      Paranoid (H)      Phobia      Unspecified chronic suppurative otitis media      Unspecified epilepsy without mention of intractable epilepsy        Past Surgical History:   Procedure Laterality Date     HC CREATE EARDRUM OPENING,GEN ANESTH  11/2001       Family History   Problem Relation Age of Onset     Alcohol/Drug Maternal Grandfather      Heart Disease Maternal Grandfather         great     Allergies Mother         pcn     Anesthesia Reaction Mother         hard to awaken     Depression Mother      Allergies Brother         zantac,pcn     Allergies Maternal Aunt         x 2 of pcn     Cancer Maternal Grandmother          "great -lung     Thyroid Disease Maternal Grandmother      Genetic Disorder Other         downs?     Genetic Disorder Paternal Aunt         downs     Gastrointestinal Disease Brother      Heart Disease Paternal Grandfather      Hypertension Paternal Grandfather      Heart Disease Father      Thyroid Disease Maternal Aunt        Social History     Tobacco Use     Smoking status: Passive Smoke Exposure - Never Smoker     Smokeless tobacco: Never Used     Tobacco comment: mother smokes, uncle   Substance Use Topics     Alcohol use: Not Currently          ROS  Review of Systems   REVIEW OF SYSTEMS  General: negative for, night sweats, dizziness, fatigue  Resp: No shortness of breath and no cough  CV: negative for chest pain, syncope or near-syncope  GI: negative for nausea, vomiting and diarrhea  : negative for dysuria and hematuria  Musculoskeletal: as above  Neurologic: negative for syncope   Hematologic: negative for bleeding disorder    Physical / Results   Physical Exam         Physical Exam:  Vitals: Ht 1.803 m (5' 11\")   Wt 74.4 kg (164 lb)   BMI 22.87 kg/m    BMI= Body mass index is 22.87 kg/m .  Constitutional: healthy, alert and no acute distress   Psychiatric: mentation appears normal and affect normal/bright  NEURO: no focal deficits  RESP: Normal with easy respirations and no use of accessory muscles to breathe, no audible wheezing or retractions  CV: Pulses are palpable  SKIN: Warm with no erythema  JOINT/EXTREMITIES: He is neurovascular intact.  There is no changes to his fingertips.  His brisk capillary refill.      IMAGING:   Evaluation of his right hand x-rays which show fifth metacarpal fracture in good position.  Has had very little change since the last x-rays were taken.  There is callus formation.  Independent visualization of the images was performed.    Corey Koo MD  "

## 2020-03-03 ENCOUNTER — ANCILLARY PROCEDURE (OUTPATIENT)
Dept: GENERAL RADIOLOGY | Facility: CLINIC | Age: 20
End: 2020-03-03
Attending: ORTHOPAEDIC SURGERY

## 2020-03-03 ENCOUNTER — OFFICE VISIT (OUTPATIENT)
Dept: ORTHOPEDICS | Facility: CLINIC | Age: 20
End: 2020-03-03
Payer: COMMERCIAL

## 2020-03-03 DIAGNOSIS — S62.326A CLOSED DISPLACED FRACTURE OF SHAFT OF FIFTH METACARPAL BONE OF RIGHT HAND, INITIAL ENCOUNTER: ICD-10-CM

## 2020-03-03 DIAGNOSIS — S62.326D CLOSED DISPLACED FRACTURE OF SHAFT OF FIFTH METACARPAL BONE OF RIGHT HAND WITH ROUTINE HEALING, SUBSEQUENT ENCOUNTER: Primary | ICD-10-CM

## 2020-03-03 PROCEDURE — 99207 ZZC FRACTURE CARE IN GLOBAL PERIOD: CPT | Performed by: ORTHOPAEDIC SURGERY

## 2020-03-03 PROCEDURE — 73130 X-RAY EXAM OF HAND: CPT | Mod: TC

## 2020-03-03 NOTE — LETTER
[address] [address]  Phone: [phone]  Fax: [phone]    March 3, 2020        Miguel Sol  [address] [facility] [address]          To whom it may concern:    RE: Miguel Sol    Patient was seen and treated today at our clinic.  Patient may return to work with the following:  No lifting greater than 10 pounds for 3 weeks.     Please contact me for questions or concerns.      Sincerely,        Corey Koo MD

## 2020-03-03 NOTE — LETTER
3/3/2020         RE: Miguel Sol  [address] [facility] [address]        Dear Colleague,    Thank you for referring your patient, Miguel Sol, to the [facility]. Please see a copy of my visit note below.    HISTORY OF PRESENT ILLNESS:    Miguel Sol is a 19 year old male who is seen in follow up for   Chief Complaint   Patient presents with     Fracture Followup     rt hand doi 1/28/20    Present symptoms: Patient with cast removed today.  Patient states reluctance to mobilize his right pinky.  He states no considerable pain.  Patient works as a .   Treatments tried to this point: immobilization  Friend is present.    Physical Exam:  Vitals: There were no vitals taken for this visit.  BMI= There is no height or weight on file to calculate BMI.  Constitutional: healthy, alert and no acute distress   Psychiatric: mentation appears normal and affect normal/bright  NEURO: no focal deficits  SKIN: no excoriation or erythema. No signs of infection.  JOINT/EXTREMITIES:  Affected extremity pulses are easily palpable.  Hand/Finger Exam: Inspection:Metacarpals:  slight swelling  Tender: Small Finger:  MCP joint   Range of Motion patient is very stiff and reluctant to utilize the pinky.  He is able to pull all other fingers into a full .  He has difficulty extending fully the ring and the pinky finger.  Strength: Not tested    IMAGING INTERPRETATION: X-ray images reveal a good amount of callus present at the fracture line.  The alignment has been well maintained.  Independent visualization of the images was performed.     ASSESSMENT:    Chief Complaint   Patient presents with     Fracture Followup     rt hand doi 1/28/20        ICD-10-CM    1. Closed displaced fracture of shaft of fifth metacarpal bone of right hand with routine healing, subsequent encounter S62.326D XR Hand Right G/E 3 Views     Patient with no change in alignment.  Callus is present.    Plan:   Cast was  removed today.  Patient may now begin utilizing his right hand for everyday activity.  He should avoid any type of lifting over the next 2 weeks.  Note was written for patient workplace no lifting greater than 10 pounds with the right hand.  Patient works as a  and indicates he would be able to do most of his duties with his left hand.  Return to clinic 2 to 3 weeks at which time we will repeat x-rays and assess his range of motion.    BP Readings from Last 1 Encounters:   02/25/20 105/63       BP noted to be well controlled today in office.     Scribed by  Isa Burrell PA-C   3/3/2020  1:16 PM    The information in this document, created by a scribe for me, accurately reflects the services I personally performed and the decisions made by me. I have reviewed and approved this document for accuracy    Corey Koo MD    Again, thank you for allowing me to participate in the care of your patient.        Sincerely,        Corey Koo MD

## 2020-03-03 NOTE — PROGRESS NOTES
HISTORY OF PRESENT ILLNESS:    Miguel Sol is a 19 year old male who is seen in follow up for   Chief Complaint   Patient presents with     Fracture Followup     rt hand doi 1/28/20    Present symptoms: Patient with cast removed today.  Patient states reluctance to mobilize his right pinky.  He states no considerable pain.  Patient works as a .   Treatments tried to this point: immobilization  Friend is present.    Physical Exam:  Vitals: There were no vitals taken for this visit.  BMI= There is no height or weight on file to calculate BMI.  Constitutional: healthy, alert and no acute distress   Psychiatric: mentation appears normal and affect normal/bright  NEURO: no focal deficits  SKIN: no excoriation or erythema. No signs of infection.  JOINT/EXTREMITIES:  Affected extremity pulses are easily palpable.  Hand/Finger Exam: Inspection:Metacarpals:  slight swelling  Tender: Small Finger:  MCP joint   Range of Motion patient is very stiff and reluctant to utilize the pinky.  He is able to pull all other fingers into a full .  He has difficulty extending fully the ring and the pinky finger.  Strength: Not tested    IMAGING INTERPRETATION: X-ray images reveal a good amount of callus present at the fracture line.  The alignment has been well maintained.  Independent visualization of the images was performed.     ASSESSMENT:    Chief Complaint   Patient presents with     Fracture Followup     rt hand doi 1/28/20        ICD-10-CM    1. Closed displaced fracture of shaft of fifth metacarpal bone of right hand with routine healing, subsequent encounter S62.326D XR Hand Right G/E 3 Views     Patient with no change in alignment.  Callus is present.    Plan:   Cast was removed today.  Patient may now begin utilizing his right hand for everyday activity.  He should avoid any type of lifting over the next 2 weeks.  Note was written for patient workplace no lifting greater than 10 pounds with the right  hand.  Patient works as a  and indicates he would be able to do most of his duties with his left hand.  Return to clinic 2 to 3 weeks at which time we will repeat x-rays and assess his range of motion.    BP Readings from Last 1 Encounters:   02/25/20 105/63       BP noted to be well controlled today in office.     Scribed by  Isa Burrell PA-C   3/3/2020  1:16 PM    The information in this document, created by a scribe for me, accurately reflects the services I personally performed and the decisions made by me. I have reviewed and approved this document for accuracy    Corey Koo MD

## 2020-04-02 ENCOUNTER — TELEPHONE (OUTPATIENT)
Dept: ORTHOPEDICS | Facility: OTHER | Age: 20
End: 2020-04-02
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
## 2020-04-02 NOTE — TELEPHONE ENCOUNTER
Patient does not want to go to ER - is okay with seeing DAYANA Todd tomorrow in [place], this would be okay per JAIDEN Valladares to see DAYANA Todd tomorrow.  I tried to schedule patient but he does not know when he would have a ride so he will call back to schedule this when he knows.      SCHEDULERS: This is okay to schedule with DAYANA Todd in [place] tomorrow, please have him come early for X-ray.      Patient checked CMS over phone and this is normal.  No concerning symptoms as per below.     Patient states the reason no surgery was done is because the plate in his hand would disqualify him from the .      Mery GIBBS, Lead RN, BSN. . .  4/2/2020, 1:57 PM
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
## 2020-04-03 ENCOUNTER — ANCILLARY PROCEDURE (OUTPATIENT)
Dept: GENERAL RADIOLOGY | Facility: OTHER | Age: 20
End: 2020-04-03
Attending: PHYSICIAN ASSISTANT
Payer: COMMERCIAL

## 2020-04-03 ENCOUNTER — OFFICE VISIT (OUTPATIENT)
Dept: ORTHOPEDICS | Facility: OTHER | Age: 20
End: 2020-04-03
Payer: COMMERCIAL

## 2020-04-03 VITALS — HEART RATE: 95 BPM | OXYGEN SATURATION: 96 %

## 2020-04-03 DIAGNOSIS — S60.221A CONTUSION OF RIGHT HAND, INITIAL ENCOUNTER: ICD-10-CM

## 2020-04-03 DIAGNOSIS — S62.326D CLOSED DISPLACED FRACTURE OF SHAFT OF FIFTH METACARPAL BONE OF RIGHT HAND WITH ROUTINE HEALING, SUBSEQUENT ENCOUNTER: Primary | ICD-10-CM

## 2020-04-03 PROCEDURE — 73130 X-RAY EXAM OF HAND: CPT | Mod: RT

## 2020-04-03 PROCEDURE — 99213 OFFICE O/P EST LOW 20 MIN: CPT | Mod: 24 | Performed by: PHYSICIAN ASSISTANT

## 2020-04-03 ASSESSMENT — PAIN SCALES - GENERAL: PAINLEVEL: NO PAIN (0)
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
## 2020-04-03 NOTE — TELEPHONE ENCOUNTER
Thanks for letting me know.  Esequiel also told me.  I will wait to see if he comes in.  Thank you
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
## 2020-09-11 ENCOUNTER — APPOINTMENT (OUTPATIENT)
Dept: GENERAL RADIOLOGY | Facility: CLINIC | Age: 20
End: 2020-09-11
Attending: FAMILY MEDICINE
Payer: COMMERCIAL

## 2020-09-11 ENCOUNTER — HOSPITAL ENCOUNTER (EMERGENCY)
Facility: CLINIC | Age: 20
Discharge: HOME | End: 2020-09-11
Attending: FAMILY MEDICINE | Admitting: FAMILY MEDICINE

## 2020-09-11 VITALS
OXYGEN SATURATION: 96 % | HEART RATE: 85 BPM | RESPIRATION RATE: 16 BRPM | SYSTOLIC BLOOD PRESSURE: 121 MMHG | TEMPERATURE: 96.2 F | DIASTOLIC BLOOD PRESSURE: 73 MMHG
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
## 2020-09-11 DIAGNOSIS — S20.211A RIB CONTUSION, RIGHT, INITIAL ENCOUNTER: ICD-10-CM

## 2020-09-11 PROCEDURE — 99284 EMERGENCY DEPT VISIT MOD MDM: CPT | Mod: 25 | Performed by: FAMILY MEDICINE

## 2020-09-11 PROCEDURE — 93010 ELECTROCARDIOGRAM REPORT: CPT | Mod: Z6 | Performed by: FAMILY MEDICINE

## 2020-09-11 PROCEDURE — 93005 ELECTROCARDIOGRAM TRACING: CPT | Performed by: FAMILY MEDICINE

## 2020-09-11 PROCEDURE — 99284 EMERGENCY DEPT VISIT MOD MDM: CPT | Performed by: FAMILY MEDICINE

## 2020-09-11 PROCEDURE — 71046 X-RAY EXAM CHEST 2 VIEWS: CPT | Mod: TC

## 2020-09-11 NOTE — ED AVS SNAPSHOT
[facility] Emergency Department  [address]   [place] [address]  Phone:  [phone]  Fax:  [phone]                                    Miguel Sol   MRN: [id number]    Department:  [facility] Emergency Department   Date of Visit:  9/11/2020           After Visit Summary Signature Page    I have received my discharge instructions, and my questions have been answered. I have discussed any challenges I see with this plan with the nurse or doctor.    ..........................................................................................................................................  Patient/Patient Representative Signature      ..........................................................................................................................................  Patient Representative Print Name and Relationship to Patient    ..................................................               ................................................  Date                                   Time    ..........................................................................................................................................  Reviewed by Signature/Title    ...................................................              ..............................................  Date                                               Time          22EPIC Rev 08/18

## 2020-09-11 NOTE — LETTER
[facility]  Emergency Room  [address] [place]   [address]  Tel: [phone]   Fax: [phone]  2020    Miguel Sol  [address] [place] [address]  [phone] (home)     : 2000          To Whom it May Concern:    Miguel Sol was seen in our ER today, 2020. I expect his condition to improve over the next 2-3 days.  He will miss work tonight and may return to work, without restriction, when improved. If not improved during the above expected time period,  then I have encouraged him to be rechecked in his clinic for further evaluation.      Please contact me for questions or concerns.    Sincerely,       Khurram Damon MD
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
## 2020-09-27 ENCOUNTER — HOSPITAL ENCOUNTER (EMERGENCY)
Facility: CLINIC | Age: 20
Discharge: HOME | End: 2020-09-27
Attending: NURSE PRACTITIONER | Admitting: NURSE PRACTITIONER
Payer: COMMERCIAL

## 2020-09-27 ENCOUNTER — APPOINTMENT (OUTPATIENT)
Dept: CT IMAGING | Facility: CLINIC | Age: 20
End: 2020-09-27
Attending: NURSE PRACTITIONER
Payer: COMMERCIAL

## 2020-09-27 VITALS
DIASTOLIC BLOOD PRESSURE: 74 MMHG | TEMPERATURE: 98 F | SYSTOLIC BLOOD PRESSURE: 121 MMHG | RESPIRATION RATE: 17 BRPM | HEART RATE: 76 BPM | OXYGEN SATURATION: 100 %

## 2020-09-27 DIAGNOSIS — S09.90XA INJURY OF HEAD, INITIAL ENCOUNTER: ICD-10-CM

## 2020-09-27 PROCEDURE — 25000132 ZZH RX MED GY IP 250 OP 250 PS 637: Performed by: NURSE PRACTITIONER

## 2020-09-27 PROCEDURE — 99284 EMERGENCY DEPT VISIT MOD MDM: CPT | Mod: Z6 | Performed by: NURSE PRACTITIONER

## 2020-09-27 PROCEDURE — 70450 CT HEAD/BRAIN W/O DYE: CPT

## 2020-09-27 PROCEDURE — 99284 EMERGENCY DEPT VISIT MOD MDM: CPT | Mod: 25 | Performed by: NURSE PRACTITIONER

## 2020-09-27 RX ORDER — IBUPROFEN 600 MG/1
600 TABLET, FILM COATED ORAL ONCE
Status: COMPLETED | OUTPATIENT
Start: 2020-09-27 | End: 2020-09-27

## 2020-09-27 RX ADMIN — IBUPROFEN 600 MG: 600 TABLET ORAL at 23:33

## 2020-09-27 NOTE — ED AVS SNAPSHOT
[facility] Emergency Department  [address]   [place] [address]  Phone:  [phone]  Fax:  [phone]                                    Miguel Sol   MRN: [id number]    Department:  [facility] Emergency Department   Date of Visit:  9/27/2020           After Visit Summary Signature Page    I have received my discharge instructions, and my questions have been answered. I have discussed any challenges I see with this plan with the nurse or doctor.    ..........................................................................................................................................  Patient/Patient Representative Signature      ..........................................................................................................................................  Patient Representative Print Name and Relationship to Patient    ..................................................               ................................................  Date                                   Time    ..........................................................................................................................................  Reviewed by Signature/Title    ...................................................              ..............................................  Date                                               Time          22EPIC Rev 08/18

## 2020-09-27 NOTE — LETTER
September 27, 2020      To Whom It May Concern:      Miguel Sol was seen in our Emergency Department today, 09/27/20.  Please excuse him from work through Tuesday, 9/29/2020.    Thank you      Sincerely,        CRISTOBAL Harrington CNP

## 2020-09-28 NOTE — DISCHARGE INSTRUCTIONS
Your CT scan is normal  You can continue to take Ibuprofen or Tylenol for headaches.  I would limit screen time and get plenty of rest for the next few days.  Return here with any new concerns or worsening symptoms.
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
## 2021-06-27 ENCOUNTER — APPOINTMENT (OUTPATIENT)
Dept: GENERAL RADIOLOGY | Facility: CLINIC | Age: 21
End: 2021-06-27
Attending: FAMILY MEDICINE
Payer: COMMERCIAL

## 2021-06-27 ENCOUNTER — HOSPITAL ENCOUNTER (EMERGENCY)
Facility: CLINIC | Age: 21
Discharge: HOME | End: 2021-06-27
Attending: FAMILY MEDICINE | Admitting: FAMILY MEDICINE
Payer: COMMERCIAL

## 2021-06-27 VITALS
BODY MASS INDEX: 27.89 KG/M2 | OXYGEN SATURATION: 98 % | RESPIRATION RATE: 16 BRPM | WEIGHT: 200 LBS | SYSTOLIC BLOOD PRESSURE: 122 MMHG | HEART RATE: 68 BPM | TEMPERATURE: 98.3 F | DIASTOLIC BLOOD PRESSURE: 59 MMHG

## 2021-06-27 DIAGNOSIS — S62.326A DISPLACED FRACTURE OF SHAFT OF FIFTH METACARPAL BONE, RIGHT HAND, INITIAL ENCOUNTER FOR CLOSED FRACTURE: ICD-10-CM

## 2021-06-27 PROCEDURE — 99284 EMERGENCY DEPT VISIT MOD MDM: CPT | Mod: 25 | Performed by: FAMILY MEDICINE

## 2021-06-27 PROCEDURE — 26605 TREAT METACARPAL FRACTURE: CPT | Mod: 54 | Performed by: FAMILY MEDICINE

## 2021-06-27 PROCEDURE — 73130 X-RAY EXAM OF HAND: CPT | Mod: RT

## 2021-06-27 PROCEDURE — 999N000065 XR HAND RT G/E 3 VW: Mod: RT

## 2021-06-27 PROCEDURE — 26605 TREAT METACARPAL FRACTURE: CPT | Mod: RT | Performed by: FAMILY MEDICINE
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
## 2021-06-27 NOTE — DISCHARGE INSTRUCTIONS
You had a displaced fracture of the shaft of the fifth metacarpal bone.  This was reduced with local anesthetic and splint.  Keep the splint clean and dry until you see the orthopedic surgeon in 5-7 days.  Please see the attached handout.  Elevate the hand is much as possible.  Take ibuprofen/Tylenol as needed for pain.

## 2021-06-30 ENCOUNTER — OFFICE VISIT (OUTPATIENT)
Dept: ORTHOPEDICS | Facility: CLINIC | Age: 21
End: 2021-06-30

## 2021-06-30 ENCOUNTER — ANCILLARY PROCEDURE (OUTPATIENT)
Dept: GENERAL RADIOLOGY | Facility: CLINIC | Age: 21
End: 2021-06-30
Attending: ORTHOPAEDIC SURGERY

## 2021-06-30 VITALS
DIASTOLIC BLOOD PRESSURE: 60 MMHG | SYSTOLIC BLOOD PRESSURE: 106 MMHG | WEIGHT: 200 LBS | HEIGHT: 71 IN | BODY MASS INDEX: 28 KG/M2

## 2021-06-30 DIAGNOSIS — S60.221A CONTUSION OF RIGHT HAND, INITIAL ENCOUNTER: ICD-10-CM

## 2021-06-30 DIAGNOSIS — S60.221A CONTUSION OF RIGHT HAND, INITIAL ENCOUNTER: Primary | ICD-10-CM

## 2021-06-30 PROCEDURE — 26605 TREAT METACARPAL FRACTURE: CPT | Mod: 55 | Performed by: ORTHOPAEDIC SURGERY

## 2021-06-30 PROCEDURE — 73130 X-RAY EXAM OF HAND: CPT | Mod: TC | Performed by: RADIOLOGY

## 2021-06-30 PROCEDURE — 99214 OFFICE O/P EST MOD 30 MIN: CPT | Mod: 57 | Performed by: ORTHOPAEDIC SURGERY

## 2021-06-30 ASSESSMENT — MIFFLIN-ST. JEOR: SCORE: 1939.32

## 2021-06-30 ASSESSMENT — PAIN SCALES - GENERAL: PAINLEVEL: NO PAIN (0)
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
## 2021-07-14 ENCOUNTER — OFFICE VISIT (OUTPATIENT)
Dept: ORTHOPEDICS | Facility: CLINIC | Age: 21
End: 2021-07-14
Payer: COMMERCIAL

## 2021-07-14 ENCOUNTER — ANCILLARY PROCEDURE (OUTPATIENT)
Dept: GENERAL RADIOLOGY | Facility: CLINIC | Age: 21
End: 2021-07-14
Attending: ORTHOPAEDIC SURGERY
Payer: COMMERCIAL

## 2021-07-14 VITALS
SYSTOLIC BLOOD PRESSURE: 130 MMHG | WEIGHT: 200 LBS | DIASTOLIC BLOOD PRESSURE: 68 MMHG | HEIGHT: 71 IN | BODY MASS INDEX: 28 KG/M2

## 2021-07-14 DIAGNOSIS — S62.326D CLOSED DISPLACED FRACTURE OF SHAFT OF FIFTH METACARPAL BONE OF RIGHT HAND WITH ROUTINE HEALING, SUBSEQUENT ENCOUNTER: ICD-10-CM

## 2021-07-14 DIAGNOSIS — S62.326D CLOSED DISPLACED FRACTURE OF SHAFT OF FIFTH METACARPAL BONE OF RIGHT HAND WITH ROUTINE HEALING, SUBSEQUENT ENCOUNTER: Primary | ICD-10-CM

## 2021-07-14 PROCEDURE — 73130 X-RAY EXAM OF HAND: CPT | Mod: TC | Performed by: RADIOLOGY

## 2021-07-14 PROCEDURE — 99207 PR FRACTURE CARE IN GLOBAL PERIOD: CPT | Performed by: ORTHOPAEDIC SURGERY

## 2021-07-14 ASSESSMENT — MIFFLIN-ST. JEOR: SCORE: 1939.32

## 2021-07-14 ASSESSMENT — PAIN SCALES - GENERAL: PAINLEVEL: NO PAIN (0)
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
## 2021-07-26 ENCOUNTER — OFFICE VISIT (OUTPATIENT)
Dept: ORTHOPEDICS | Facility: CLINIC | Age: 21
End: 2021-07-26
Payer: COMMERCIAL

## 2021-07-26 ENCOUNTER — ANCILLARY PROCEDURE (OUTPATIENT)
Dept: GENERAL RADIOLOGY | Facility: CLINIC | Age: 21
End: 2021-07-26
Attending: ORTHOPAEDIC SURGERY
Payer: COMMERCIAL

## 2021-07-26 VITALS
WEIGHT: 200 LBS | SYSTOLIC BLOOD PRESSURE: 130 MMHG | BODY MASS INDEX: 28 KG/M2 | DIASTOLIC BLOOD PRESSURE: 72 MMHG | HEIGHT: 71 IN

## 2021-07-26 DIAGNOSIS — S62.326D CLOSED DISPLACED FRACTURE OF SHAFT OF FIFTH METACARPAL BONE OF RIGHT HAND WITH ROUTINE HEALING, SUBSEQUENT ENCOUNTER: ICD-10-CM

## 2021-07-26 DIAGNOSIS — S62.326D CLOSED DISPLACED FRACTURE OF SHAFT OF FIFTH METACARPAL BONE OF RIGHT HAND WITH ROUTINE HEALING, SUBSEQUENT ENCOUNTER: Primary | ICD-10-CM

## 2021-07-26 PROCEDURE — 73130 X-RAY EXAM OF HAND: CPT | Mod: TC | Performed by: RADIOLOGY

## 2021-07-26 PROCEDURE — 99207 PR FRACTURE CARE IN GLOBAL PERIOD: CPT | Performed by: NURSE PRACTITIONER

## 2021-07-26 ASSESSMENT — MIFFLIN-ST. JEOR: SCORE: 1934.32

## 2021-07-26 ASSESSMENT — PAIN SCALES - GENERAL: PAINLEVEL: NO PAIN (0)
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
## 2021-08-06 ENCOUNTER — HOSPITAL ENCOUNTER (EMERGENCY)
Facility: CLINIC | Age: 21
Discharge: HOME | End: 2021-08-06
Attending: NURSE PRACTITIONER | Admitting: NURSE PRACTITIONER
Payer: COMMERCIAL

## 2021-08-06 ENCOUNTER — APPOINTMENT (OUTPATIENT)
Dept: GENERAL RADIOLOGY | Facility: CLINIC | Age: 21
End: 2021-08-06
Attending: NURSE PRACTITIONER
Payer: COMMERCIAL

## 2021-08-06 VITALS
WEIGHT: 200.9 LBS | TEMPERATURE: 98.7 F | HEART RATE: 84 BPM | DIASTOLIC BLOOD PRESSURE: 65 MMHG | SYSTOLIC BLOOD PRESSURE: 110 MMHG | OXYGEN SATURATION: 97 % | BODY MASS INDEX: 28.02 KG/M2

## 2021-08-06 DIAGNOSIS — Z87.81 HISTORY OF HAND FRACTURE: ICD-10-CM

## 2021-08-06 DIAGNOSIS — S69.91XA HAND INJURY, RIGHT, INITIAL ENCOUNTER: ICD-10-CM

## 2021-08-06 PROCEDURE — 73130 X-RAY EXAM OF HAND: CPT | Mod: RT

## 2021-08-06 PROCEDURE — 99282 EMERGENCY DEPT VISIT SF MDM: CPT | Performed by: NURSE PRACTITIONER

## 2021-08-06 PROCEDURE — 99283 EMERGENCY DEPT VISIT LOW MDM: CPT | Performed by: NURSE PRACTITIONER

## 2021-08-06 ASSESSMENT — ENCOUNTER SYMPTOMS
WEAKNESS: 0
HEMATOLOGIC/LYMPHATIC NEGATIVE: 1
FEVER: 0
NUMBNESS: 0

## 2021-08-06 NOTE — ED TRIAGE NOTES
He tripped and hit his R hand on the ground. It was healing from a previous fracture a month ago and he was not wearing his brace.

## 2021-08-06 NOTE — ED NOTES
Offered patient an ice pack and encouraged him to wash his hand in the sink with soap and water.  Patient declined.
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
## 2021-08-07 NOTE — DISCHARGE INSTRUCTIONS
When back home wear splint and follow up with specialist as previously scheduled- No new injury on xray

## 2023-03-23 ENCOUNTER — TRANSFERRED RECORDS (OUTPATIENT)
Dept: HEALTH INFORMATION MANAGEMENT | Facility: CLINIC | Age: 23
End: 2023-03-23
Payer: COMMERCIAL

## 2023-03-28 ENCOUNTER — TRANSFERRED RECORDS (OUTPATIENT)
Dept: HEALTH INFORMATION MANAGEMENT | Facility: CLINIC | Age: 23
End: 2023-03-28
Payer: COMMERCIAL

## 2023-03-28 ENCOUNTER — MEDICAL CORRESPONDENCE (OUTPATIENT)
Dept: HEALTH INFORMATION MANAGEMENT | Facility: CLINIC | Age: 23
End: 2023-03-28
Payer: COMMERCIAL

## 2023-03-29 ENCOUNTER — TRANSCRIBE ORDERS (OUTPATIENT)
Dept: OTHER | Age: 23
End: 2023-03-29

## 2023-03-29 DIAGNOSIS — M25.511 PAIN IN RIGHT SHOULDER: Primary | ICD-10-CM

## 2023-03-29 DIAGNOSIS — M79.18 MYALGIA, OTHER SITE: ICD-10-CM

## 2024-03-04 ENCOUNTER — HOSPITAL ENCOUNTER (EMERGENCY)
Facility: CLINIC | Age: 24
Discharge: HOME | End: 2024-03-04
Attending: FAMILY MEDICINE | Admitting: FAMILY MEDICINE

## 2024-03-04 VITALS
BODY MASS INDEX: 28.02 KG/M2 | HEART RATE: 73 BPM | DIASTOLIC BLOOD PRESSURE: 89 MMHG | OXYGEN SATURATION: 100 % | RESPIRATION RATE: 20 BRPM | TEMPERATURE: 97.9 F | HEIGHT: 71 IN | SYSTOLIC BLOOD PRESSURE: 143 MMHG

## 2024-03-04 DIAGNOSIS — T22.112A SUPERFICIAL BURN OF LEFT FOREARM, INITIAL ENCOUNTER: ICD-10-CM

## 2024-03-04 DIAGNOSIS — R51.9 ACUTE NONINTRACTABLE HEADACHE, UNSPECIFIED HEADACHE TYPE: ICD-10-CM

## 2024-03-04 PROCEDURE — 99283 EMERGENCY DEPT VISIT LOW MDM: CPT | Performed by: FAMILY MEDICINE

## 2024-03-04 ASSESSMENT — COLUMBIA-SUICIDE SEVERITY RATING SCALE - C-SSRS
6. HAVE YOU EVER DONE ANYTHING, STARTED TO DO ANYTHING, OR PREPARED TO DO ANYTHING TO END YOUR LIFE?: NO
2. HAVE YOU ACTUALLY HAD ANY THOUGHTS OF KILLING YOURSELF IN THE PAST MONTH?: NO
1. IN THE PAST MONTH, HAVE YOU WISHED YOU WERE DEAD OR WISHED YOU COULD GO TO SLEEP AND NOT WAKE UP?: NO

## 2024-03-04 ASSESSMENT — ACTIVITIES OF DAILY LIVING (ADL): ADLS_ACUITY_SCORE: 33

## 2024-03-04 ASSESSMENT — ENCOUNTER SYMPTOMS
WEAKNESS: 0
NUMBNESS: 0
HEADACHES: 1
FEVER: 0

## 2024-03-04 NOTE — Clinical Note
Miguel Sol was seen and treated in our emergency department on 3/4/2024.  He may return to work on 03/05/2024.       If you have any questions or concerns, please don't hesitate to call.      Clif Walters MD
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
## 2024-10-02 NOTE — MR AVS SNAPSHOT
After Visit Summary   5/31/2017    Miguel Sol    MRN: [id number]           Patient Information     Date Of Birth          2000        Visit Information        Provider Department      5/31/2017 9:15 AM Xavier Asher DPM [facility]        Today's Diagnoses     Ingrowing nail    -  1      Care Instructions    INGROWN TOENAIL POSTOPERATIVE INSTRUCTIONS   (Nail avulsion or chemical matrixectomy)   1.  Go directly home and elevate the affected foot on one or two pillows for the remainder of the day & evening. Your toe may stay numb for 2-8 hours.   2.  Take Tylenol, ibuprofen or another anti-inflammatory as needed for pain.   3.  Use oral antibiotic if that was prescribed at your doctor visit. Take the entire prescription even if your symptoms have improved.   4.  Keep dressing dry and intact the day of the procedure. The morning after the procedure, remove entire dressing and soak or wash the affected area in lukewarm water for 5-10 minutes.  You may add Epsom salt to soothe the area and help it become drier. Do this twice a day or more until the surgical site remains dry without drainage.  This may take 1-2 weeks if a small part of your nail was removed or 4-8 weeks if the entire nail was removed. You may count showering or bathing as one soak.  After each soak, pat the area dry with a clean towel or gauze and then allow to air dry for a few minutes. You may apply topical antibiotics, 3-4 drops of Cortisporin if it was prescribed at your visit or apply a very small amount of ointment like Bacitracin or Neosporin to the area.  Then cover with a cloth or fabric bandaid.    5.  You may walk and pursue everyday activities as tolerated with either an open toe shoe or cut-out shoe as needed. You may wear regular roomy shoes if no pain is noted.  No swimming in the lake, river, pool or hot tub until the wound has been dry for 3 days.   7.  Watch for any signs or symptoms of infection  such as: red streaks going up the foot/leg, swelling, pus or foul odor. For patients that have had a phenol procedure, the toe will drain longer and may look similar to infection because it is a chemical burn.  Please call with questions.  8.  Follow up in my office in 1-2 weeks if the surgical site has not become dry or if other complications occur.  9.  There is 5-10% chance of complications such as infection or formation of another nail or a thick scared nail.              Follow-ups after your visit        Your next 10 appointments already scheduled     Jun 07, 2017  2:00 PM CDT   Office Visit with Hadley Casillas PA-C   [facility] ([facility]    [address] [facility] [address] [phone]           Bring a current list of meds and any records pertaining to this visit.  For Physicals, please bring immunization records and any forms needing to be filled out.  Please arrive 10 minutes early to complete paperwork.              Who to contact     If you have questions or need follow up information about today's clinic visit or your schedule please contact [facility] directly at [phone].  Normal or non-critical lab and imaging results will be communicated to you by [employer], letter or phone within 4 business days after the clinic has received the results. If you do not hear from us within 7 days, please contact the clinic through [employer] or phone. If you have a critical or abnormal lab result, we will notify you by phone as soon as possible.  Submit refill requests through [employer] or call your pharmacy and they will forward the refill request to us. Please allow 3 business days for your refill to be completed.          Additional Information About Your Visit        MyChart Information     [employer] lets you send messages to your doctor, view your test results, renew your prescriptions, schedule appointments and more. To sign up, go to [email or web address],  "contact your [facility] or call [phone] during business hours.            Care EveryWhere ID     This is your Care EveryWhere ID. This could be used by other organizations to access your [place] medical records  Opted out of Care Everywhere exchange        Your Vitals Were     Temperature Height BMI (Body Mass Index)             96.8  F (36  C) (Temporal) 5' 8\" (1.727 m) 30.41 kg/m2          Blood Pressure from Last 3 Encounters:   05/24/17 110/64   04/05/17 100/68   06/02/16 110/60    Weight from Last 3 Encounters:   05/31/17 200 lb (90.7 kg) (96 %)*   05/24/17 195 lb 1.6 oz (88.5 kg) (95 %)*   05/03/17 200 lb (90.7 kg) (96 %)*     * Growth percentiles are based on [facility] [place] 2-20 Years data.              Today, you had the following     No orders found for display       Primary Care Provider    None Specified       No primary provider on file.        Thank you!     Thank you for choosing [facility]  for your care. Our goal is always to provide you with excellent care. Hearing back from our patients is one way we can continue to improve our services. Please take a few minutes to complete the written survey that you may receive in the mail after your visit with us. Thank you!             Your Updated Medication List - Protect others around you: Learn how to safely use, store and throw away your medicines at www.disposemymeds.org.          This list is accurate as of: 5/31/17 10:55 AM.  Always use your most recent med list.                   Brand Name Dispense Instructions for use    ofloxacin 0.3 % otic solution    FLOXIN    10 mL    Place 10 drops into the right ear 2 times daily         " Other

## 2024-11-30 ENCOUNTER — HOSPITAL ENCOUNTER (EMERGENCY)
Facility: CLINIC | Age: 24
Discharge: HOME | End: 2024-11-30
Attending: PHYSICIAN ASSISTANT | Admitting: PHYSICIAN ASSISTANT

## 2024-11-30 VITALS
HEART RATE: 98 BPM | HEIGHT: 71 IN | SYSTOLIC BLOOD PRESSURE: 136 MMHG | TEMPERATURE: 97.9 F | DIASTOLIC BLOOD PRESSURE: 91 MMHG | WEIGHT: 232 LBS | BODY MASS INDEX: 32.48 KG/M2 | OXYGEN SATURATION: 98 % | RESPIRATION RATE: 18 BRPM

## 2024-11-30 DIAGNOSIS — S61.451A DOG BITE OF RIGHT HAND: ICD-10-CM
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
## 2024-11-30 PROCEDURE — 250N000013 HC RX MED GY IP 250 OP 250 PS 637: Performed by: PHYSICIAN ASSISTANT

## 2024-11-30 PROCEDURE — 250N000011 HC RX IP 250 OP 636: Mod: JZ | Performed by: PHYSICIAN ASSISTANT

## 2024-11-30 PROCEDURE — 99284 EMERGENCY DEPT VISIT MOD MDM: CPT | Performed by: PHYSICIAN ASSISTANT

## 2024-11-30 RX ORDER — CLINDAMYCIN HYDROCHLORIDE 300 MG/1
300 CAPSULE ORAL 3 TIMES DAILY
Qty: 15 CAPSULE | Refills: 0 | Status: SHIPPED | OUTPATIENT
Start: 2024-11-30 | End: 2024-12-05

## 2024-11-30 RX ORDER — SULFAMETHOXAZOLE AND TRIMETHOPRIM 800; 160 MG/1; MG/1
1 TABLET ORAL 2 TIMES DAILY
Qty: 10 TABLET | Refills: 0 | Status: SHIPPED | OUTPATIENT
Start: 2024-11-30 | End: 2024-12-05

## 2024-11-30 RX ORDER — BUPIVACAINE HYDROCHLORIDE 5 MG/ML
10 INJECTION, SOLUTION PERINEURAL ONCE
Status: COMPLETED | OUTPATIENT
Start: 2024-11-30 | End: 2024-11-30

## 2024-11-30 RX ORDER — SULFAMETHOXAZOLE AND TRIMETHOPRIM 800; 160 MG/1; MG/1
1 TABLET ORAL ONCE
Status: COMPLETED | OUTPATIENT
Start: 2024-11-30 | End: 2024-11-30

## 2024-11-30 RX ORDER — CLINDAMYCIN HYDROCHLORIDE 150 MG/1
300 CAPSULE ORAL ONCE
Status: COMPLETED | OUTPATIENT
Start: 2024-11-30 | End: 2024-11-30

## 2024-11-30 RX ADMIN — CLINDAMYCIN HYDROCHLORIDE 300 MG: 150 CAPSULE ORAL at 23:05

## 2024-11-30 RX ADMIN — SULFAMETHOXAZOLE AND TRIMETHOPRIM 1 TABLET: 800; 160 TABLET ORAL at 23:05

## 2024-11-30 RX ADMIN — BUPIVACAINE HYDROCHLORIDE 50 MG: 5 INJECTION, SOLUTION EPIDURAL; INTRACAUDAL at 23:01

## 2024-11-30 ASSESSMENT — ACTIVITIES OF DAILY LIVING (ADL)
ADLS_ACUITY_SCORE: 41
ADLS_ACUITY_SCORE: 41

## 2024-11-30 ASSESSMENT — COLUMBIA-SUICIDE SEVERITY RATING SCALE - C-SSRS
1. IN THE PAST MONTH, HAVE YOU WISHED YOU WERE DEAD OR WISHED YOU COULD GO TO SLEEP AND NOT WAKE UP?: NO
2. HAVE YOU ACTUALLY HAD ANY THOUGHTS OF KILLING YOURSELF IN THE PAST MONTH?: NO
6. HAVE YOU EVER DONE ANYTHING, STARTED TO DO ANYTHING, OR PREPARED TO DO ANYTHING TO END YOUR LIFE?: NO

## 2024-11-30 NOTE — LETTER
November 30, 2024      To Whom It May Concern:      Miguel Sol was seen in our Emergency Department today, 11/30/24.  I expect his condition to improve over the next 5-7 days.  He may return to work on 12/2/24 with the following restrictions:    No lifting over 5 pounds or significant gripping/grasping with the right hand for 5 days.    He cannot get the right hand wet for the next 7 days.      Please excuse him from work on 12/1/24 due to his injury.      Sincerely,            David Rico PA-C

## 2024-12-01 NOTE — ED TRIAGE NOTES
Patient was bit by a random dog about 2.5 hours ago. Dog bite to right hand. Police were able to talk with owner of dog and dog was vaccinated in last 3 years.      Triage Assessment (Adult)       Row Name 11/30/24 2602          Triage Assessment    Airway WDL WDL        Respiratory WDL    Respiratory WDL WDL        Skin Circulation/Temperature WDL    Skin Circulation/Temperature WDL X  dog bite to right pointer finger, middle finger and palm        Cardiac WDL    Cardiac WDL WDL        Peripheral/Neurovascular WDL    Peripheral Neurovascular WDL WDL        Cognitive/Neuro/Behavioral WDL    Cognitive/Neuro/Behavioral WDL WDL
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
## 2024-12-01 NOTE — DISCHARGE INSTRUCTIONS
It was a pleasure working with you today!  I hope your condition improves rapidly!     Please change your bandage daily.  Use bacitracin ointment, Vaseline nonstick dressing, gauze, and Kerlix wrap.  The index finger skin wound should heal slowly over the next 7-10 days.  The sutures can be removed in 7 days.  You are given the first dose of antibiotic here in the emergency department.  Your next dose would be tomorrow right away when the pharmacy opens.  Please take the full course to prevent infection.  Please also verify the vaccination status of the dog on Monday.  If the dog is appropriately up-to-date with rabies vaccine, then you do not need the rabies series.  If the dog has not had up-to-date rabies vaccine, then it can be quarantined for 10 days.  If this shows no sign of illness or death in the 10 days, then you do not require the rabies postexposure prophylaxis regimen.

## 2025-02-17 ENCOUNTER — APPOINTMENT (OUTPATIENT)
Dept: GENERAL RADIOLOGY | Facility: CLINIC | Age: 25
End: 2025-02-17
Attending: EMERGENCY MEDICINE

## 2025-02-17 ENCOUNTER — HOSPITAL ENCOUNTER (EMERGENCY)
Facility: CLINIC | Age: 25
Discharge: HOME | End: 2025-02-17
Attending: EMERGENCY MEDICINE | Admitting: EMERGENCY MEDICINE

## 2025-02-17 VITALS
SYSTOLIC BLOOD PRESSURE: 128 MMHG | WEIGHT: 250 LBS | BODY MASS INDEX: 34.87 KG/M2 | RESPIRATION RATE: 18 BRPM | DIASTOLIC BLOOD PRESSURE: 72 MMHG | HEART RATE: 89 BPM | TEMPERATURE: 98.1 F | OXYGEN SATURATION: 95 %

## 2025-02-17 DIAGNOSIS — S76.212A STRAIN OF LEFT GROIN: ICD-10-CM

## 2025-02-17 DIAGNOSIS — S39.012A LOW BACK STRAIN, INITIAL ENCOUNTER: ICD-10-CM

## 2025-02-17 PROCEDURE — 99283 EMERGENCY DEPT VISIT LOW MDM: CPT | Performed by: EMERGENCY MEDICINE

## 2025-02-17 PROCEDURE — 72100 X-RAY EXAM L-S SPINE 2/3 VWS: CPT

## 2025-02-17 ASSESSMENT — ACTIVITIES OF DAILY LIVING (ADL)
ADLS_ACUITY_SCORE: 41
ADLS_ACUITY_SCORE: 41
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
## 2025-02-17 NOTE — ED TRIAGE NOTES
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  He has been taking ibuprofen and not getting better.     Triage Assessment (Adult)       Row Name 02/17/25 1144          Triage Assessment    Airway WDL WDL        Respiratory WDL    Respiratory WDL WDL        Skin Circulation/Temperature WDL    Skin Circulation/Temperature WDL WDL        Cardiac WDL    Cardiac WDL WDL        Peripheral/Neurovascular WDL    Peripheral Neurovascular WDL WDL        Cognitive/Neuro/Behavioral WDL    Cognitive/Neuro/Behavioral WDL WDL